# Patient Record
Sex: MALE | Race: WHITE | URBAN - METROPOLITAN AREA
[De-identification: names, ages, dates, MRNs, and addresses within clinical notes are randomized per-mention and may not be internally consistent; named-entity substitution may affect disease eponyms.]

---

## 2017-01-16 ENCOUNTER — TRANSFERRED RECORDS (OUTPATIENT)
Dept: HEALTH INFORMATION MANAGEMENT | Facility: CLINIC | Age: 32
End: 2017-01-16

## 2017-02-22 ENCOUNTER — HOSPITAL ENCOUNTER (OUTPATIENT)
Dept: PHYSICAL THERAPY | Facility: CLINIC | Age: 32
Setting detail: THERAPIES SERIES
End: 2017-02-22
Attending: PODIATRIST
Payer: COMMERCIAL

## 2017-02-22 PROCEDURE — 40000718 ZZHC STATISTIC PT DEPARTMENT ORTHO VISIT: Performed by: PHYSICAL THERAPIST

## 2017-02-22 PROCEDURE — 97161 PT EVAL LOW COMPLEX 20 MIN: CPT | Mod: GP | Performed by: PHYSICAL THERAPIST

## 2017-02-22 PROCEDURE — 97110 THERAPEUTIC EXERCISES: CPT | Mod: GP | Performed by: PHYSICAL THERAPIST

## 2017-02-22 PROCEDURE — 97140 MANUAL THERAPY 1/> REGIONS: CPT | Mod: GP | Performed by: PHYSICAL THERAPIST

## 2017-02-22 NOTE — PROGRESS NOTES
Theo Herrera  1985 Physical Therapy Initial Evaluation  02/22/17 0800   General Information   Type of Visit Initial OP Ortho PT Evaluation   Start of Care Date 02/22/17   Referring Physician Zelalem Mcdermott    Patient/Family Goals Statement Walk without pain   Orders Evaluate and Treat   Date of Order 01/16/17   Insurance Type [employer]   Insurance Comments/Visits Authorized 40   Medical Diagnosis Post right ankle ORIF of bimalleolar fracture - large medial melleolar fracture   Surgical/Medical history reviewed Yes   Precautions/Limitations no known precautions/limitations   Body Part(s)   Body Part(s) Ankle/Foot   Presentation and Etiology   Pertinent history of current problem (include personal factors and/or comorbidities that impact the POC) Broke ankle when fell in November. Had surgery in November. No swelling in ankle right now. Does have a plate on each side of ankle however. Wants to be able to go golfing and workout but is limited right now. Will get pain with walking, standing. Has trouble with stairs due to ROM issues. Has been out of boot for about 1 month and off of crutches for 2 months. / No comorbidities affecting physical therapy   Impairments D. Decreased ROM;E. Decreased flexibility;F. Decreased strength and endurance;M. Locking or catching   Functional Limitations perform activities of daily living;perform desired leisure / sports activities;perform required work activities   Symptom Location Right foot   How/Where did it occur With a fall;At home   Onset date of current episode/exacerbation 11/21/16  (Date of Surgery)   Chronicity Chronic   Pain rating (0-10 point scale) Best (/10);Worst (/10)   Best (/10) 2   Worst (/10) 8   Pain quality C. Aching   Frequency of pain/symptoms B. Intermittent   Pain/symptoms exacerbated by B. Walking;C. Lifting;D. Carrying   Pain/symptoms eased by J. Braces/supports   Progression of symptoms since onset: Improved   Prior Level of Function   Prior Level  of Function-Mobility Ind   Prior Level of Function-ADLs Ind   Current Level of Function   Living environment House/townhome   Home/community accessibility 1 flight of stairs   Current equipment-Gait/Locomotion None   Fall Risk Screen   Fall screen completed by PT   Per patient - Fall 2 or more times in past year? No   Per patient - Fall with injury in past year? Yes   Timed Up and Go score (seconds) 8   Is patient a fall risk? No   Ankle/Foot Objective Findings   Side (if bilateral, select both right and left) Right   Integumentary Surgical sites are well healed with no signs of infection noted   Gait/Locomotion Good heel toe gait pattern. Good lumbar rotation and arm swing   Palpation Surgical sites with scar tissue present making mobilization difficult   Right DF (Knee Ext) AROM 10 / Left - 28   Right PF AROM 30 / Left - 45   Right Calcanceal Inversion AROM 29 / Left - 39   Right Calcaneal Eversion AROM 35 / Left - 25   Right DF/Inversion Strength <3/5 due to ROM restrictions   Right DF/Eversion Strength <3/5 due to ROM restrictions   Right PF/Inversion Strength <3/5 due to ROM restrictions   Right PF/Eversion Strength <3/5 due to ROM restrictions   Right PF Strength <3/5 due to ROM restrictions   Right Gastroc (in WB) Flexibility Moderately restricted   Right Soleus (in WB) Flexibility Moderately restricted   Planned Therapy Interventions   Planned Therapy Interventions balance training;manual therapy;neuromuscular re-education;ROM;strengthening;stretching   Planned Modality Interventions   Planned Modality Interventions Cryotherapy;Hot packs;Ultrasound   Clinical Impression   Criteria for Skilled Therapeutic Interventions Met yes, treatment indicated   PT Diagnosis S/P ORIF of bimalleolar fracture resulting in ROM and strength deficits   Influenced by the following impairments Strength deficits / ROM deficits   Functional limitations due to impairments Difficulty with ambulation, stairs, exercise routine    Clinical Presentation Stable/Uncomplicated   Clinical Presentation Rationale Medical condition is not progressing   Clinical Decision Making (Complexity) Low complexity   Therapy Frequency (1-2 times / week)   Predicted Duration of Therapy Intervention (days/wks) 8 weeks   Risk & Benefits of therapy have been explained Yes   Patient, Family & other staff in agreement with plan of care Yes   Education Assessment   Preferred Learning Style Listening;Reading;Demonstration;Pictures/video   Barriers to Learning No barriers   ORTHO GOALS   PT Ortho Eval Goals 1;2;3;4   Ortho Goal 1   Goal Identifier HEP   Goal Description Pt will be independent in HEP in order to achieve long term treatment goals.   Target Date 03/08/17   Ortho Goal 2   Goal Identifier Stairs   Goal Description Pt will be able to ascend and descend 1 flight of stairs with a reciprical gait pattern safely.   Target Date 04/19/17   Ortho Goal 3   Goal Identifier Exercise   Goal Description Pt will be able increase ROM and strength in order to return to jumping rope and boxing classes (upon clearance from surgeon).   Target Date 04/19/17   Total Evaluation Time   Total Evaluation Time 20     Juan C Conway, PT, DPT

## 2017-03-02 ENCOUNTER — HOSPITAL ENCOUNTER (OUTPATIENT)
Dept: PHYSICAL THERAPY | Facility: CLINIC | Age: 32
Setting detail: THERAPIES SERIES
End: 2017-03-02
Attending: PODIATRIST
Payer: COMMERCIAL

## 2017-03-02 PROCEDURE — 40000718 ZZHC STATISTIC PT DEPARTMENT ORTHO VISIT: Performed by: PHYSICAL THERAPIST

## 2017-03-02 PROCEDURE — 97140 MANUAL THERAPY 1/> REGIONS: CPT | Mod: GP | Performed by: PHYSICAL THERAPIST

## 2017-03-02 PROCEDURE — 97110 THERAPEUTIC EXERCISES: CPT | Mod: GP | Performed by: PHYSICAL THERAPIST

## 2017-04-06 ENCOUNTER — DOCUMENTATION ONLY (OUTPATIENT)
Dept: PHYSICAL THERAPY | Facility: CLINIC | Age: 32
End: 2017-04-06

## 2017-04-06 NOTE — PROGRESS NOTES
Outpatient Physical Therapy Discharge Note     Patient: Theo Herrera  : 1985    Beginning/End Dates of Reporting Period:  2017 to 3/2/2017 (Total of 2 visits)    Referring Provider: Zelalem Mcdermott    Diagnosis: Pain in right ankle     Client Self Report:  (From date of last visit)  Pt states ankle is  feeling tight, feeling  sore in the morning.     Objective Measurements: (From date of last visit)  Ankle AROM - Pt supine. 15* dorsifle-  xion with PT cueing on  not using toes. plantar-  flexion 45*. measured  after resisted isotonics  with PT overpressure  into plantarflexion/  dorsiflexion.    Treatment Has Consisted Of:  ROM and strengthening exercises / Pt education regarding diagnosis / Soft tissue mobilization    Goals:  Goals had not been met at time of last visit.    Plan:  Discharge from therapy.    Discharge:    Reason for Discharge: Patient has failed to schedule further appointments.    Equipment Issued: None    Discharge Plan: Patient to continue home program.    Juan C Conway, PT, DPT

## 2019-01-25 ENCOUNTER — HOSPITAL ENCOUNTER (EMERGENCY)
Facility: CLINIC | Age: 34
Discharge: HOME | End: 2019-01-25
Attending: PHYSICIAN ASSISTANT | Admitting: PHYSICIAN ASSISTANT
Payer: COMMERCIAL

## 2019-01-25 VITALS
BODY MASS INDEX: 31.14 KG/M2 | TEMPERATURE: 97.8 F | HEART RATE: 77 BPM | WEIGHT: 235 LBS | SYSTOLIC BLOOD PRESSURE: 133 MMHG | DIASTOLIC BLOOD PRESSURE: 89 MMHG | OXYGEN SATURATION: 100 % | RESPIRATION RATE: 18 BRPM | HEIGHT: 73 IN

## 2019-01-25 DIAGNOSIS — T16.1XXA ACUTE FOREIGN BODY OF RIGHT EAR CANAL, INITIAL ENCOUNTER: ICD-10-CM

## 2019-01-25 DIAGNOSIS — H69.91 DYSFUNCTION OF RIGHT EUSTACHIAN TUBE: ICD-10-CM

## 2019-01-25 PROCEDURE — G0463 HOSPITAL OUTPT CLINIC VISIT: HCPCS | Mod: 25 | Performed by: PHYSICIAN ASSISTANT

## 2019-01-25 PROCEDURE — 69200 CLEAR OUTER EAR CANAL: CPT | Mod: RT | Performed by: PHYSICIAN ASSISTANT

## 2019-01-25 PROCEDURE — 99214 OFFICE O/P EST MOD 30 MIN: CPT | Mod: 25 | Performed by: PHYSICIAN ASSISTANT

## 2019-01-25 RX ORDER — OFLOXACIN 3 MG/ML
10 SOLUTION AURICULAR (OTIC) DAILY
Qty: 5 ML | Refills: 0 | Status: SHIPPED | OUTPATIENT
Start: 2019-01-25 | End: 2019-05-02

## 2019-01-25 ASSESSMENT — ENCOUNTER SYMPTOMS
RHINORRHEA: 0
HEADACHES: 0
SINUS PRESSURE: 0
SINUS PAIN: 0
SHORTNESS OF BREATH: 0
FEVER: 0
DIZZINESS: 0
SORE THROAT: 0
COUGH: 0

## 2019-01-25 ASSESSMENT — MIFFLIN-ST. JEOR: SCORE: 2074.83

## 2019-01-25 NOTE — ED PROVIDER NOTES
History     Chief Complaint   Patient presents with     Foreign Body in Ear     part of ear wax candle in R ear     HPI  Theo Herrera is a 33 year old male who presents to the urgent care with tip of ear wax candle in right ear canal. Patient was trying to remove wax from ear canal this morning and the tip of the ear candle did not come out of the ear canal when patient removed the ear wax candle. Patient states he is pretty sure he still has wax in the ear canal also because he has noticed muffled hearing lately. Patient denies fevers, ear pain, drainage from ears, ringing in ears, nasal congestion/drainage, fevers, or recent illness. Patient does wear ear plugs daily at work.     Allergies:  Allergies   Allergen Reactions     Nka [No Known Allergies]        Problem List:    Patient Active Problem List    Diagnosis Date Noted     CARDIOVASCULAR SCREENING; LDL GOAL LESS THAN 160 11/07/2014     Priority: Medium        Past Medical History:    History reviewed. No pertinent past medical history.    Past Surgical History:    Past Surgical History:   Procedure Laterality Date     OPEN REDUCTION INTERNAL FIXATION ANKLE Right 11/21/2016    Procedure: OPEN REDUCTION INTERNAL FIXATION ANKLE;  Surgeon: Zelalem Mcdermott DPM;  Location: [facility] OR     SURGICAL HISTORY OF -   6/17/2008    lt thumb hardware removal       Family History:    No family history on file.    Social History:  Marital Status:  Single [1]  Social History     Tobacco Use     Smoking status: Never Smoker     Smokeless tobacco: Never Used   Substance Use Topics     Alcohol use: Yes     Comment: weekends     Drug use: No        Medications:      ofloxacin (FLOXIN) 0.3 % otic solution   cephALEXin (KEFLEX) 500 MG capsule   hydrOXYzine (ATARAX) 25 MG tablet   oxyCODONE-acetaminophen (PERCOCET) 5-325 MG per tablet         Review of Systems   Constitutional: Negative for fever.   HENT: Negative for congestion, rhinorrhea, sinus pressure, sinus pain and sore  "throat.         Foreign body in right ear canal. Muffled hearing in right ear.    Respiratory: Negative for cough and shortness of breath.    Neurological: Negative for dizziness and headaches.   All other systems reviewed and are negative.      Physical Exam   BP: 133/89  Pulse: 77  Temp: 97.8  F (36.6  C)  Resp: 18  Height: 185.4 cm (6' 1\")  Weight: 106.6 kg (235 lb)  SpO2: 100 %      Physical Exam   Constitutional: He appears well-developed and well-nourished. No distress.   HENT:   Head: Normocephalic and atraumatic.   Right Ear: External ear normal. A foreign body (green plastic ear tip to ear wax candle about 1.5cm in length. ) is present.   Left Ear: Tympanic membrane, external ear and ear canal normal.   Nose: Nose normal.   Mouth/Throat: Uvula is midline, oropharynx is clear and moist and mucous membranes are normal.   Right ear canal one foreign body was removed had no cerumen impaction. Ear canal slightly erythematous with no bleeding or abrasion noted and no swelling. No tragal or auricular tenderness. Positive fluid noted behind right TM with no erythema, bulging, or retractions.    Neck: Normal range of motion. Neck supple.   Cardiovascular: Normal rate, regular rhythm and normal heart sounds.   Pulmonary/Chest: Effort normal and breath sounds normal.   Lymphadenopathy:     He has no cervical adenopathy.   Skin: Skin is warm. He is not diaphoretic.   Psychiatric: He has a normal mood and affect. His behavior is normal. Judgment and thought content normal.       ED Course        FB removal  Date/Time: 1/25/2019 1:49 PM  Performed by: Susan Feng PA-C  Authorized by: Susan Feng PA-C   Consent: Verbal consent obtained.  Consent given by: patient  Patient identity confirmed: verbally with patient  Body area: ear  Location details: right ear    Sedation:  Patient sedated: no    Patient restrained: no  Patient cooperative: yes  Localization method: visualized  Removal mechanism: " forceps  Complexity: simple  1 objects recovered.  Objects recovered: 1  Post-procedure assessment: foreign body removed  Patient tolerance: Patient tolerated the procedure well with no immediate complications                  Critical Care time:  none               No results found for this or any previous visit (from the past 24 hour(s)).    Medications - No data to display    Assessments & Plan (with Medical Decision Making)     I have reviewed the nursing notes.    I have reviewed the findings, diagnosis, plan and need for follow up with the patient.   33-year-old male presents the urgent care foreign body to the right ear canal.  Foreign body removed with no complications in office today.  Ear canal slightly erythematous with no swelling, or tenderness post foreign body extraction.  Patient given prescription for Floxin eardrops to use if ear canal pain starts.  Discussed with patient that there is no cerumen impaction, and there is slight fluid noted behind the TM on the right side.  This is likely due to eustachian tube dysfunction.  No concerns for otitis media at this time.  Patient to use Flonase nasal spray daily for the next 1-2 weeks then as needed to see if this improves the eustachian tube dysfunction.  Patient to follow-up with ENT if diminished hearing or symptoms persist.       Medication List      Started    ofloxacin 0.3 % otic solution  Commonly known as:  FLOXIN  10 drops, Right Ear, DAILY            Final diagnoses:   Acute foreign body of right ear canal, initial encounter   Dysfunction of right eustachian tube       1/25/2019   [facility] EMERGENCY DEPARTMENT     Susan Feng PA-C  01/25/19 [id number]

## 2019-01-25 NOTE — ED AVS SNAPSHOT
[facility] Emergency Department  [address] [address]  Phone:  [phone]  Fax:  [phone]                                    Theo Herrera   MRN: [id number]    Department:  [facility] Emergency Department   Date of Visit:  1/25/2019           After Visit Summary Signature Page    I have received my discharge instructions, and my questions have been answered. I have discussed any challenges I see with this plan with the nurse or doctor.    ..........................................................................................................................................  Patient/Patient Representative Signature      ..........................................................................................................................................  Patient Representative Print Name and Relationship to Patient    ..................................................               ................................................  Date                                   Time    ..........................................................................................................................................  Reviewed by Signature/Title    ...................................................              ..............................................  Date                                               Time          22EPIC Rev 08/18

## 2019-05-01 ENCOUNTER — ALLIED HEALTH/NURSE VISIT (OUTPATIENT)
Dept: FAMILY MEDICINE | Facility: CLINIC | Age: 34
End: 2019-05-01

## 2019-05-01 DIAGNOSIS — F41.9 ANXIETY: Primary | ICD-10-CM

## 2019-05-01 PROCEDURE — 99207 ZZC NO CHARGE NURSE ONLY: CPT

## 2019-05-02 ENCOUNTER — TELEPHONE (OUTPATIENT)
Dept: FAMILY MEDICINE | Facility: CLINIC | Age: 34
End: 2019-05-02

## 2019-05-02 ENCOUNTER — OFFICE VISIT (OUTPATIENT)
Dept: FAMILY MEDICINE | Facility: CLINIC | Age: 34
End: 2019-05-02
Payer: COMMERCIAL

## 2019-05-02 VITALS
HEIGHT: 73 IN | HEART RATE: 79 BPM | OXYGEN SATURATION: 98 % | SYSTOLIC BLOOD PRESSURE: 116 MMHG | DIASTOLIC BLOOD PRESSURE: 88 MMHG | RESPIRATION RATE: 16 BRPM | TEMPERATURE: 95.5 F | WEIGHT: 237.6 LBS | BODY MASS INDEX: 31.49 KG/M2

## 2019-05-02 DIAGNOSIS — R41.840 CONCENTRATION DEFICIT: Primary | ICD-10-CM

## 2019-05-02 PROCEDURE — 99213 OFFICE O/P EST LOW 20 MIN: CPT | Performed by: NURSE PRACTITIONER

## 2019-05-02 ASSESSMENT — ANXIETY QUESTIONNAIRES
6. BECOMING EASILY ANNOYED OR IRRITABLE: MORE THAN HALF THE DAYS
7. FEELING AFRAID AS IF SOMETHING AWFUL MIGHT HAPPEN: SEVERAL DAYS
5. BEING SO RESTLESS THAT IT IS HARD TO SIT STILL: NEARLY EVERY DAY
2. NOT BEING ABLE TO STOP OR CONTROL WORRYING: NEARLY EVERY DAY
3. WORRYING TOO MUCH ABOUT DIFFERENT THINGS: NEARLY EVERY DAY
IF YOU CHECKED OFF ANY PROBLEMS ON THIS QUESTIONNAIRE, HOW DIFFICULT HAVE THESE PROBLEMS MADE IT FOR YOU TO DO YOUR WORK, TAKE CARE OF THINGS AT HOME, OR GET ALONG WITH OTHER PEOPLE: EXTREMELY DIFFICULT
1. FEELING NERVOUS, ANXIOUS, OR ON EDGE: NEARLY EVERY DAY
GAD7 TOTAL SCORE: 18

## 2019-05-02 ASSESSMENT — PATIENT HEALTH QUESTIONNAIRE - PHQ9
SUM OF ALL RESPONSES TO PHQ QUESTIONS 1-9: 20
5. POOR APPETITE OR OVEREATING: NEARLY EVERY DAY

## 2019-05-02 ASSESSMENT — MIFFLIN-ST. JEOR: SCORE: 2086.63

## 2019-05-02 NOTE — PATIENT INSTRUCTIONS
Patient Education     Attention-Deficit/Hyperactivity Disorder (ADHD) in Adults  You ve always had trouble concentrating. Your mind wanders, and it s hard to finish tasks. As a result, you didn t do well in school. And now, you often struggle with your job. Sometimes this makes you brewer or depressed. These may be symptoms of attention-deficit/hyperactivity disorder (ADHD). To find out more, talk to your healthcare provider. He or she can offer guidance and support.  Symptoms  of ADHD in adults  For an adult to be diagnosed with ADHD, the symptoms must have been present since childhood. The symptoms may include:    Trouble thinking things through    Low self-esteem    Depression    Trouble holding a job    Memory problems    Problems with a marriage or relationship    Lack of discipline   What is ADHD?  Attention-deficit/hyperactivity disorder makes it hard to focus your mind. You may daydream a lot. And you may be restless much of the time. As a result, you may have trouble with detailed or boring work. And it may be hard to stick with one project for very long. You also may forget things. Or, you may miss key points during a lecture or meeting. You may even have trouble sitting through a movie or concert. At times, you may feel frustrated or angry. This can affect your relationships with others.  Who does it affect?  ADHD starts in childhood. Sometimes, your symptoms may improve as you get older. But they also may persist into your adult years. ADHD is often thought of as a  kid s problem.  That s why it s often missed in adults. In fact, many parents learn they have ADHD when their children are diagnosed.  What causes it?  The exact cause of ADHD isn t known. The disorder does run in families. Having one parent with ADHD makes it more likely you ll have it too. And the part of your brain that controls attention may be involved. Certain brain chemicals that are out of balance may also play a role.  What can be  done?  The first step is finding out if you really have ADHD. Your doctor will use special guidelines to diagnose the disorder. Most adults with ADHD are greatly helped by therapy and coaching. In some cases, your doctor may also prescribe medicine to ease your symptoms.  Resources    National Resource Center on AD/HDwww.ahfy6xgsz.org    Attention Deficit Disorder Associationwww.add.org   Date Last Reviewed: 1/1/2017 2000-2018 The [employer]. [address] [place] [address]. All rights reserved. This information is not intended as a substitute for professional medical care. Always follow your healthcare professional's instructions.

## 2019-05-02 NOTE — TELEPHONE ENCOUNTER
Pt was seen this AM for Anxiety.  Pt referred to Mental Health Clinic to be evaluated for ADD/ADHD prior to being given Anxiety meds. Next available is in June. Encouraged pt to schedule. Jose

## 2019-05-02 NOTE — TELEPHONE ENCOUNTER
Reason for Call:  referral    Detailed comments: patient is calling and stating that he was seen today by Sherri Alexis and was referred to Mental Health, but is find that it is 1 - 2 months out for appt's. Please advise. He said he cannot wait this long.    Phone Number Patient can be reached at: Home number on file [phone] (home)    Best Time: any    Can we leave a detailed message on this number? YES   Tracy Randall  [facility]  [facility]      Call taken on 5/2/2019 at 12:43 PM by Tracy Randall

## 2019-05-02 NOTE — PROGRESS NOTES
"  SUBJECTIVE:   Theo Herrera is a 33 year old male who presents to clinic today for the following   health issues:    Feels like his depression is much better than it has been in the past. Feels his main issue is a concentration problem. 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 Looking back feels like he has had this issue his whole life. Has had relationship issues and is single. Feels like the anxiety is secondary to worrying about why he didn't get his tasks accomplished and gets frustrated. He does admit to trying a friends Adderall and having a lot of improvement in his symptoms.     Abnormal Mood Symptoms  Onset: 15 years     Description:   Depression: YES- had been on medication, one he doesn't remember, but it didn't help   Anxiety: YES    Accompanying Signs & Symptoms:  Still participating in activities that you used to enjoy: YES  Fatigue: YES  Irritability: YES  Difficulty concentrating: YES- very  Changes in appetite: YES- no appetite throughout the day, able to eat at night when \"brain shuts off a bit\"  Problems with sleep: YES- goes to bed late, gets up every hour or so  Heart racing/beating fast : YES- with certain situations   Thoughts of hurting yourself or others:  Yes- in the past, most recent a couple years thoughts of hurting self    History:   Recent stress: YES- business, works from home. Also has another job. Not able to concentrate on all of it. Not accomplishing tasks and he has a hard time with that   Prior depression hospitalization: YES 10 years ago   Family history of depression: no   Family history of anxiety: no     Precipitating factors:   Alcohol/drug use: YES- alcohol on weeks     Alleviating factors:  Family, exercise, trying to stay busy     Therapies Tried and outcome: unsure what med, years ago      Additional history: as documented    Reviewed  and updated as " "needed this visit by clinical staff         Reviewed and updated as needed this visit by Provider         Patient Active Problem List   Diagnosis     CARDIOVASCULAR SCREENING; LDL GOAL LESS THAN 160     Past Surgical History:   Procedure Laterality Date     OPEN REDUCTION INTERNAL FIXATION ANKLE Right 11/21/2016    Procedure: OPEN REDUCTION INTERNAL FIXATION ANKLE;  Surgeon: Zelalem Mcdermott DPM;  Location: [facility] OR     SURGICAL HISTORY OF -   6/17/2008    lt thumb hardware removal       Social History     Tobacco Use     Smoking status: Never Smoker     Smokeless tobacco: Never Used   Substance Use Topics     Alcohol use: Yes     Comment: weekends     History reviewed. No pertinent family history.        ROS:  Constitutional, HEENT, cardiovascular, pulmonary, gi and gu systems are negative, except as otherwise noted.    OBJECTIVE:     /88 (BP Location: Right arm, Patient Position: Sitting, Cuff Size: Adult Large)   Pulse 79   Temp 95.5  F (35.3  C) (Tympanic)   Resp 16   Ht 1.854 m (6' 1\")   Wt 107.8 kg (237 lb 9.6 oz)   SpO2 98%   BMI 31.35 kg/m    Body mass index is 31.35 kg/m .  GENERAL: healthy, alert and no distress  CV: regular rates and rhythm  NEURO: Normal strength and tone, mentation intact and speech normal  PSYCH: mentation appears normal, affect normal/bright, anxious, judgement and insight intact and appearance well groomed    Diagnostic Test Results:  none     ASSESSMENT/PLAN:       ICD-10-CM    1. Concentration deficit R41.840 MENTAL HEALTH REFERRAL  - Adult; Assessments and Testing; ADHD; Other: [facility] [phone]; We will contact you to schedule the appointment or please call with any questions       CONSULTATION/REFERRAL to MENTAL HEALTH for evaluation for ADHD, patient declines serotonin specific reuptake inhibitor today and would like evaluation first.     FUTURE APPOINTMENTS:       - RETURN TO CLINIC for new or worsening symptoms or after evaluation. "     Patient Instructions       Patient Education     Attention-Deficit/Hyperactivity Disorder (ADHD) in Adults  You ve always had trouble concentrating. Your mind wanders, and it s hard to finish tasks. As a result, you didn t do well in school. And now, you often struggle with your job. Sometimes this makes you brewer or depressed. These may be symptoms of attention-deficit/hyperactivity disorder (ADHD). To find out more, talk to your healthcare provider. He or she can offer guidance and support.  Symptoms  of ADHD in adults  For an adult to be diagnosed with ADHD, the symptoms must have been present since childhood. The symptoms may include:    Trouble thinking things through    Low self-esteem    Depression    Trouble holding a job    Memory problems    Problems with a marriage or relationship    Lack of discipline   What is ADHD?  Attention-deficit/hyperactivity disorder makes it hard to focus your mind. You may daydream a lot. And you may be restless much of the time. As a result, you may have trouble with detailed or boring work. And it may be hard to stick with one project for very long. You also may forget things. Or, you may miss key points during a lecture or meeting. You may even have trouble sitting through a movie or concert. At times, you may feel frustrated or angry. This can affect your relationships with others.  Who does it affect?  ADHD starts in childhood. Sometimes, your symptoms may improve as you get older. But they also may persist into your adult years. ADHD is often thought of as a  kid s problem.  That s why it s often missed in adults. In fact, many parents learn they have ADHD when their children are diagnosed.  What causes it?  The exact cause of ADHD isn t known. The disorder does run in families. Having one parent with ADHD makes it more likely you ll have it too. And the part of your brain that controls attention may be involved. Certain brain chemicals that are out of balance may  also play a role.  What can be done?  The first step is finding out if you really have ADHD. Your doctor will use special guidelines to diagnose the disorder. Most adults with ADHD are greatly helped by therapy and coaching. In some cases, your doctor may also prescribe medicine to ease your symptoms.  Resources    National Resource Center on AD/HDwww.bvlj4ovcx.org    Attention Deficit Disorder Associationwww.add.org   Date Last Reviewed: 1/1/2017 2000-2018 The [employer]. [address] [place] [address]. All rights reserved. This information is not intended as a substitute for professional medical care. Always follow your healthcare professional's instructions.               RICKY Garza [facility]

## 2019-05-03 ASSESSMENT — ANXIETY QUESTIONNAIRES: GAD7 TOTAL SCORE: 18
